# Patient Record
Sex: FEMALE | Race: OTHER | HISPANIC OR LATINO | URBAN - METROPOLITAN AREA
[De-identification: names, ages, dates, MRNs, and addresses within clinical notes are randomized per-mention and may not be internally consistent; named-entity substitution may affect disease eponyms.]

---

## 2019-02-06 ENCOUNTER — OUTPATIENT (OUTPATIENT)
Dept: OUTPATIENT SERVICES | Facility: HOSPITAL | Age: 69
LOS: 1 days | Discharge: HOME | End: 2019-02-06

## 2019-02-06 DIAGNOSIS — N18.2 CHRONIC KIDNEY DISEASE, STAGE 2 (MILD): ICD-10-CM

## 2019-02-06 DIAGNOSIS — E78.00 PURE HYPERCHOLESTEROLEMIA, UNSPECIFIED: ICD-10-CM

## 2019-02-06 DIAGNOSIS — E55.9 VITAMIN D DEFICIENCY, UNSPECIFIED: ICD-10-CM

## 2019-02-06 DIAGNOSIS — N39.0 URINARY TRACT INFECTION, SITE NOT SPECIFIED: ICD-10-CM

## 2019-02-06 DIAGNOSIS — D64.9 ANEMIA, UNSPECIFIED: ICD-10-CM

## 2019-02-06 DIAGNOSIS — K76.89 OTHER SPECIFIED DISEASES OF LIVER: ICD-10-CM

## 2019-02-06 DIAGNOSIS — R70.0 ELEVATED ERYTHROCYTE SEDIMENTATION RATE: ICD-10-CM

## 2021-01-08 ENCOUNTER — APPOINTMENT (OUTPATIENT)
Dept: INTERNAL MEDICINE | Facility: CLINIC | Age: 71
End: 2021-01-08
Payer: MEDICARE

## 2021-01-08 VITALS
HEIGHT: 62 IN | WEIGHT: 104 LBS | OXYGEN SATURATION: 98 % | SYSTOLIC BLOOD PRESSURE: 122 MMHG | DIASTOLIC BLOOD PRESSURE: 70 MMHG | TEMPERATURE: 97.2 F | BODY MASS INDEX: 19.14 KG/M2 | HEART RATE: 91 BPM

## 2021-01-08 DIAGNOSIS — M25.571 PAIN IN RIGHT ANKLE AND JOINTS OF RIGHT FOOT: ICD-10-CM

## 2021-01-08 PROCEDURE — 99203 OFFICE O/P NEW LOW 30 MIN: CPT

## 2021-01-08 RX ORDER — IBUPROFEN 800 MG/1
800 TABLET, FILM COATED ORAL 3 TIMES DAILY
Qty: 60 | Refills: 0 | Status: ACTIVE | COMMUNITY
Start: 2021-01-08 | End: 1900-01-01

## 2021-01-08 NOTE — HISTORY OF PRESENT ILLNESS
[Musculoskeletal Symptoms Legs] : leg [___ Days ago] : [unfilled] days ago [Constant] : constant [Mild] : mild [Rest] : rest [Activity] : with activity [Improving] : improving [FreeTextEntry2] : right ankle hurts with ambulation [FreeTextEntry8] : left ankle pain after falling down 6 stairs\par

## 2021-01-08 NOTE — HEALTH RISK ASSESSMENT
[No] : In the past 12 months have you used drugs other than those required for medical reasons? No [Any fall with injury in past year] : Patient reported fall with injury in the past year [0] : 2) Feeling down, depressed, or hopeless: Not at all (0) [] : No [de-identified] : No [de-identified] : No [de-identified] : Walking, doing exercises at home a few times a week  [de-identified] : Regular Diet  [de-identified] : Right ankle hurts Tail bone and left arm a lot of pain [[id number]] : 0

## 2021-01-08 NOTE — ASSESSMENT
[FreeTextEntry1] : suspect ankle sprain\par pain imporving\par will get xray to r/o fracture\par RICE therapy discussed

## 2021-04-01 ENCOUNTER — APPOINTMENT (OUTPATIENT)
Dept: INTERNAL MEDICINE | Facility: CLINIC | Age: 71
End: 2021-04-01
Payer: MEDICARE

## 2021-04-01 ENCOUNTER — NON-APPOINTMENT (OUTPATIENT)
Age: 71
End: 2021-04-01

## 2021-04-01 VITALS
HEIGHT: 62 IN | SYSTOLIC BLOOD PRESSURE: 110 MMHG | OXYGEN SATURATION: 98 % | DIASTOLIC BLOOD PRESSURE: 60 MMHG | BODY MASS INDEX: 19.69 KG/M2 | HEART RATE: 89 BPM | TEMPERATURE: 97.2 F | WEIGHT: 107 LBS

## 2021-04-01 DIAGNOSIS — Z01.818 ENCOUNTER FOR OTHER PREPROCEDURAL EXAMINATION: ICD-10-CM

## 2021-04-01 PROCEDURE — 36415 COLL VENOUS BLD VENIPUNCTURE: CPT

## 2021-04-01 PROCEDURE — 93000 ELECTROCARDIOGRAM COMPLETE: CPT

## 2021-04-01 PROCEDURE — 99213 OFFICE O/P EST LOW 20 MIN: CPT | Mod: 25

## 2021-04-01 NOTE — HISTORY OF PRESENT ILLNESS
[No Pertinent Cardiac History] : no history of aortic stenosis, atrial fibrillation, coronary artery disease, recent myocardial infarction, or implantable device/pacemaker [No Pertinent Pulmonary History] : no history of asthma, COPD, sleep apnea, or smoking [No Adverse Anesthesia Reaction] : no adverse anesthesia reaction in self or family member [(Patient denies any chest pain, claudication, dyspnea on exertion, orthopnea, palpitations or syncope)] : Patient denies any chest pain, claudication, dyspnea on exertion, orthopnea, palpitations or syncope [Excellent (>10 METs)] : Excellent (>10 METs) [Chronic Anticoagulation] : no chronic anticoagulation [Chronic Kidney Disease] : no chronic kidney disease [Diabetes] : no diabetes [FreeTextEntry1] : Gamma Knife Radiosurgery [FreeTextEntry2] : 4/15/2021 [FreeTextEntry3] : Dr. Knowles [FreeTextEntry4] : [facility] [FreeTextEntry7] : EKG- NSR rate 69 BPM, no ischemia

## 2021-04-05 LAB
ANION GAP SERPL CALC-SCNC: 15 MMOL/L
BASOPHILS # BLD AUTO: 0.06 K/UL
BASOPHILS NFR BLD AUTO: 0.9 %
BUN SERPL-MCNC: 15 MG/DL
CALCIUM SERPL-MCNC: 9.8 MG/DL
CHLORIDE SERPL-SCNC: 101 MMOL/L
CO2 SERPL-SCNC: 22 MMOL/L
CREAT SERPL-MCNC: 0.56 MG/DL
EOSINOPHIL # BLD AUTO: 0.07 K/UL
EOSINOPHIL NFR BLD AUTO: 1 %
GLUCOSE SERPL-MCNC: 108 MG/DL
HCT VFR BLD CALC: 40.6 %
HGB BLD-MCNC: 12.5 G/DL
IMM GRANULOCYTES NFR BLD AUTO: 0.1 %
LYMPHOCYTES # BLD AUTO: 1.87 K/UL
LYMPHOCYTES NFR BLD AUTO: 27.4 %
MAN DIFF?: NORMAL
MCHC RBC-ENTMCNC: 29.3 PG
MCHC RBC-ENTMCNC: 30.8 GM/DL
MCV RBC AUTO: 95.1 FL
MONOCYTES # BLD AUTO: 0.59 K/UL
MONOCYTES NFR BLD AUTO: 8.7 %
NEUTROPHILS # BLD AUTO: 4.22 K/UL
NEUTROPHILS NFR BLD AUTO: 61.9 %
PLATELET # BLD AUTO: 231 K/UL
POTASSIUM SERPL-SCNC: 4.3 MMOL/L
RBC # BLD: 4.27 M/UL
RBC # FLD: 13.8 %
SODIUM SERPL-SCNC: 138 MMOL/L
WBC # FLD AUTO: 6.82 K/UL

## 2021-07-12 ENCOUNTER — APPOINTMENT (OUTPATIENT)
Dept: INTERNAL MEDICINE | Facility: CLINIC | Age: 71
End: 2021-07-12
Payer: MEDICARE

## 2021-07-12 VITALS
HEIGHT: 62 IN | OXYGEN SATURATION: 99 % | TEMPERATURE: 97.6 F | BODY MASS INDEX: 19.32 KG/M2 | DIASTOLIC BLOOD PRESSURE: 80 MMHG | SYSTOLIC BLOOD PRESSURE: 126 MMHG | HEART RATE: 88 BPM | WEIGHT: 105 LBS

## 2021-07-12 DIAGNOSIS — Z00.00 ENCOUNTER FOR GENERAL ADULT MEDICAL EXAMINATION W/OUT ABNORMAL FINDINGS: ICD-10-CM

## 2021-07-12 PROCEDURE — 99214 OFFICE O/P EST MOD 30 MIN: CPT | Mod: 25

## 2021-07-12 PROCEDURE — 36415 COLL VENOUS BLD VENIPUNCTURE: CPT

## 2021-07-12 NOTE — ASSESSMENT
[FreeTextEntry1] : annual exam- well adult\par screening and labs ordered\par further recs pending labs\par \par Blood work drawn in office today\par

## 2021-07-12 NOTE — HEALTH RISK ASSESSMENT
[No] : In the past 12 months have you used drugs other than those required for medical reasons? No [No falls in past year] : Patient reported no falls in the past year [0] : 1) Little interest or pleasure doing things: Not at all (0) [1] : 2) Feeling down, depressed, or hopeless for several days (1) [] : No [de-identified] : Neurologist [Audit-CScore] : 0 [de-identified] : Walk [de-identified] : No red meat [[id number]] : 1

## 2021-07-12 NOTE — HISTORY OF PRESENT ILLNESS
[FreeTextEntry1] : follow up [de-identified] : recent gamma knife procedure for trigeminal neuralgia \par now is on gabapentin 300 mg 4 times a day\par feels a little nasuea with the medication \par has a 3 month follow up with the neurosurgeon

## 2021-07-14 LAB
25(OH)D3 SERPL-MCNC: 39.7 NG/ML
ALBUMIN SERPL ELPH-MCNC: 4.3 G/DL
ALP BLD-CCNC: 109 U/L
ALT SERPL-CCNC: 8 U/L
ANION GAP SERPL CALC-SCNC: 15 MMOL/L
AST SERPL-CCNC: 20 U/L
BASOPHILS # BLD AUTO: 0.07 K/UL
BASOPHILS NFR BLD AUTO: 0.9 %
BILIRUB SERPL-MCNC: 0.3 MG/DL
BUN SERPL-MCNC: 13 MG/DL
CALCIUM SERPL-MCNC: 9.8 MG/DL
CHLORIDE SERPL-SCNC: 100 MMOL/L
CHOLEST SERPL-MCNC: 216 MG/DL
CO2 SERPL-SCNC: 21 MMOL/L
CREAT SERPL-MCNC: 0.52 MG/DL
EOSINOPHIL # BLD AUTO: 0.09 K/UL
EOSINOPHIL NFR BLD AUTO: 1.2 %
ESTIMATED AVERAGE GLUCOSE: 97 MG/DL
FOLATE SERPL-MCNC: 15.2 NG/ML
GLUCOSE SERPL-MCNC: 71 MG/DL
HBA1C MFR BLD HPLC: 5 %
HCT VFR BLD CALC: 38 %
HDLC SERPL-MCNC: 75 MG/DL
HGB BLD-MCNC: 11.8 G/DL
IMM GRANULOCYTES NFR BLD AUTO: 0.3 %
IRON SATN MFR SERPL: 32 %
IRON SERPL-MCNC: 103 UG/DL
LDLC SERPL CALC-MCNC: 120 MG/DL
LYMPHOCYTES # BLD AUTO: 2.02 K/UL
LYMPHOCYTES NFR BLD AUTO: 26.2 %
MAN DIFF?: NORMAL
MCHC RBC-ENTMCNC: 29.4 PG
MCHC RBC-ENTMCNC: 31.1 GM/DL
MCV RBC AUTO: 94.5 FL
MONOCYTES # BLD AUTO: 0.65 K/UL
MONOCYTES NFR BLD AUTO: 8.4 %
NEUTROPHILS # BLD AUTO: 4.86 K/UL
NEUTROPHILS NFR BLD AUTO: 63 %
NONHDLC SERPL-MCNC: 141 MG/DL
PLATELET # BLD AUTO: 247 K/UL
POTASSIUM SERPL-SCNC: 5 MMOL/L
PROT SERPL-MCNC: 7.1 G/DL
RBC # BLD: 4.02 M/UL
RBC # FLD: 14.7 %
SODIUM SERPL-SCNC: 137 MMOL/L
TIBC SERPL-MCNC: 320 UG/DL
TRIGL SERPL-MCNC: 104 MG/DL
TSH SERPL-ACNC: 0.87 UIU/ML
UIBC SERPL-MCNC: 217 UG/DL
VIT B12 SERPL-MCNC: 773 PG/ML
WBC # FLD AUTO: 7.71 K/UL

## 2023-03-04 ENCOUNTER — APPOINTMENT (OUTPATIENT)
Dept: INTERNAL MEDICINE | Facility: CLINIC | Age: 73
End: 2023-03-04
Payer: MEDICARE

## 2023-03-04 VITALS
BODY MASS INDEX: 19.32 KG/M2 | HEIGHT: 62 IN | SYSTOLIC BLOOD PRESSURE: 125 MMHG | DIASTOLIC BLOOD PRESSURE: 77 MMHG | OXYGEN SATURATION: 99 % | WEIGHT: 105 LBS | HEART RATE: 68 BPM | TEMPERATURE: 98.1 F

## 2023-03-04 DIAGNOSIS — R11.10 VOMITING, UNSPECIFIED: ICD-10-CM

## 2023-03-04 DIAGNOSIS — Z78.9 OTHER SPECIFIED HEALTH STATUS: ICD-10-CM

## 2023-03-04 DIAGNOSIS — G50.0 TRIGEMINAL NEURALGIA: ICD-10-CM

## 2023-03-04 PROCEDURE — 99213 OFFICE O/P EST LOW 20 MIN: CPT

## 2023-03-04 RX ORDER — GABAPENTIN 300 MG/1
300 CAPSULE ORAL EVERY 6 HOURS
Qty: 120 | Refills: 0 | Status: ACTIVE | COMMUNITY
Start: 2023-03-04

## 2023-03-06 PROBLEM — R11.10 VOMITING: Status: ACTIVE | Noted: 2023-03-06

## 2023-03-06 NOTE — PHYSICAL EXAM
[Normal] : affect was normal and insight and judgment were intact [de-identified] : very mild diffuse tenderness to epigastric area, no gaurding, no rebound tenderness noted

## 2023-03-06 NOTE — HISTORY OF PRESENT ILLNESS
[FreeTextEntry8] : started 2 days ago nausea and vomiting, stools are soft, abdominal pain 5/10 points to epigastric area. No significant pain or cramping before vomiting according to Patient, only when vomiting. No fever, no chills, no blood in Vomit or stools.

## 2023-03-06 NOTE — HEALTH RISK ASSESSMENT
[No falls in past year] : Patient reported no falls in the past year [No] : In the past 12 months have you used drugs other than those required for medical reasons? No [0] : 2) Feeling down, depressed, or hopeless: Not at all (0) [Never] : Never [de-identified] : none [de-identified] : None [Audit-CScore] : 0 [de-identified] : walking [de-identified] : healthy [de-identified] : pt. feel dizzy [[id number]] : 0

## 2023-03-06 NOTE — ASSESSMENT
[FreeTextEntry1] : 72 yr old presents\par \par Vomiting- Probable Viral in Origin\par Very small sips Fluids every 30- 40 min\par to prevent dehydration , Discussed S/S Dehydration \par and when to go to ED\par \par \par Call if symptoms do not improve or worsen

## 2023-10-20 ENCOUNTER — APPOINTMENT (OUTPATIENT)
Dept: INTERNAL MEDICINE | Facility: CLINIC | Age: 73
End: 2023-10-20
Payer: MEDICARE

## 2023-10-20 VITALS
TEMPERATURE: 98.2 F | HEIGHT: 62 IN | SYSTOLIC BLOOD PRESSURE: 133 MMHG | HEART RATE: 78 BPM | BODY MASS INDEX: 20.24 KG/M2 | WEIGHT: 110 LBS | DIASTOLIC BLOOD PRESSURE: 74 MMHG | OXYGEN SATURATION: 100 %

## 2023-10-20 DIAGNOSIS — M79.671 PAIN IN RIGHT FOOT: ICD-10-CM

## 2023-10-20 DIAGNOSIS — K13.0 DISEASES OF LIPS: ICD-10-CM

## 2023-10-20 PROCEDURE — 99213 OFFICE O/P EST LOW 20 MIN: CPT

## 2023-10-20 RX ORDER — IBUPROFEN 600 MG/1
600 TABLET, FILM COATED ORAL 3 TIMES DAILY
Qty: 60 | Refills: 0 | Status: ACTIVE | COMMUNITY
Start: 2023-10-20 | End: 1900-01-01